# Patient Record
Sex: MALE | Race: BLACK OR AFRICAN AMERICAN | URBAN - METROPOLITAN AREA
[De-identification: names, ages, dates, MRNs, and addresses within clinical notes are randomized per-mention and may not be internally consistent; named-entity substitution may affect disease eponyms.]

---

## 2020-02-12 ENCOUNTER — APPOINTMENT (RX ONLY)
Dept: URBAN - METROPOLITAN AREA CLINIC 37 | Facility: CLINIC | Age: 50
Setting detail: DERMATOLOGY
End: 2020-02-12

## 2020-02-12 DIAGNOSIS — L81.9 DISORDER OF PIGMENTATION, UNSPECIFIED: ICD-10-CM

## 2020-02-12 DIAGNOSIS — A63.0 ANOGENITAL (VENEREAL) WARTS: ICD-10-CM

## 2020-02-12 DIAGNOSIS — L72.8 OTHER FOLLICULAR CYSTS OF THE SKIN AND SUBCUTANEOUS TISSUE: ICD-10-CM

## 2020-02-12 PROBLEM — D40.8 NEOPLASM OF UNCERTAIN BEHAVIOR OF OTHER SPECIFIED MALE GENITAL ORGANS: Status: ACTIVE | Noted: 2020-02-12

## 2020-02-12 PROCEDURE — ? COUNSELING

## 2020-02-12 PROCEDURE — 54100 BIOPSY OF PENIS: CPT

## 2020-02-12 PROCEDURE — ? BIOPSY BY SHAVE METHOD

## 2020-02-12 PROCEDURE — 99203 OFFICE O/P NEW LOW 30 MIN: CPT | Mod: 25

## 2020-02-12 ASSESSMENT — LOCATION DETAILED DESCRIPTION DERM
LOCATION DETAILED: RIGHT MEDIAL INFERIOR CHEST
LOCATION DETAILED: LEFT DORSAL SHAFT OF PENIS
LOCATION DETAILED: SUBMENTAL CHIN
LOCATION DETAILED: LEFT INFERIOR LATERAL NECK

## 2020-02-12 ASSESSMENT — LOCATION SIMPLE DESCRIPTION DERM
LOCATION SIMPLE: PENIS
LOCATION SIMPLE: CHEST
LOCATION SIMPLE: SUBMENTAL CHIN
LOCATION SIMPLE: LEFT ANTERIOR NECK

## 2020-02-12 ASSESSMENT — LOCATION ZONE DERM
LOCATION ZONE: FACE
LOCATION ZONE: TRUNK
LOCATION ZONE: PENIS
LOCATION ZONE: NECK

## 2020-02-12 NOTE — HPI: DISCOLORATION
How Severe Is Your Skin Discoloration?: moderate
Additional History: Patient has not tried any treatment. Patient states it has lightened up since he lost weight.

## 2020-02-12 NOTE — PROCEDURE: MIPS QUALITY
Quality 226: Preventive Care And Screening: Tobacco Use: Screening And Cessation Intervention: Patient screened for tobacco use and is an ex/non-smoker
Quality 431: Preventive Care And Screening: Unhealthy Alcohol Use - Screening: Unhealthy alcohol use screening not performed, reason not otherwise specified
Detail Level: Detailed
Quality 110: Preventive Care And Screening: Influenza Immunization: Influenza immunization was not ordered or administered, reason not given
Quality 111:Pneumonia Vaccination Status For Older Adults: Pneumococcal Vaccination not Administered or Previously Received, Reason not Otherwise Specified

## 2020-02-12 NOTE — PROCEDURE: BIOPSY BY SHAVE METHOD
Size Of Lesion In Cm: 0
Anesthesia Volume In Cc (Will Not Render If 0): 0.3
Billing Type: Third-Party Bill
Wound Care: Vaseline
Hide Accession Number?: No
Detail Level: Detailed
Consent: Verbal consent was obtained and risks were reviewed including but not limited to scarring, infection, bleeding, scabbing, incomplete removal, nerve damage and allergy to anesthesia. Also risk that hair will not grow through area.
Silver Nitrate Text: The wound bed was treated with silver nitrate after the biopsy was performed.
Render Post-Care Instructions In Note?: yes
Electrodesiccation And Curettage Text: The wound bed was treated with electrodesiccation and curettage after the biopsy was performed.
Depth Of Biopsy: dermis
Post-Care Instructions: Patient is to keep the biopsy site dry overnight, and then wash daily with mild soap and water, apply petroleum jelly, and cover with a bandaid once daily until healed, usually 1-2 weeks.
Biopsy Type: H and E
Notification Instructions: Patient will be notified of biopsy results. However, patient instructed to call the office if not contacted within 2 weeks.
Type Of Destruction Used: Curettage
Biopsy Method: Acu-Razor
Curettage Text: The wound bed was treated with curettage after the biopsy was done.
Cryotherapy Text: The wound bed was treated with cryotherapy after the biopsy was performed.
Dressing: bandage
Electrodesiccation Text: The wound bed was treated with electrodesiccation after the biopsy was performed.
Lab: [id number]
Lab Facility: [id number]
Path Notes (To The Dermatopathologist): .
Anesthesia Type: 1% lidocaine without epinephrine
Hemostasis: Electrodesiccation

## 2020-03-02 ENCOUNTER — APPOINTMENT (RX ONLY)
Dept: URBAN - METROPOLITAN AREA CLINIC 38 | Facility: CLINIC | Age: 50
Setting detail: DERMATOLOGY
End: 2020-03-02

## 2020-03-02 DIAGNOSIS — L56.5 DISSEMINATED SUPERFICIAL ACTINIC POROKERATOSIS (DSAP): ICD-10-CM

## 2020-03-02 PROCEDURE — 17110 DESTRUCTION B9 LES UP TO 14: CPT

## 2020-03-02 PROCEDURE — ? BENIGN DESTRUCTION

## 2020-03-02 ASSESSMENT — LOCATION ZONE DERM: LOCATION ZONE: PENIS

## 2020-03-02 ASSESSMENT — LOCATION SIMPLE DESCRIPTION DERM: LOCATION SIMPLE: PENIS

## 2020-03-02 ASSESSMENT — LOCATION DETAILED DESCRIPTION DERM: LOCATION DETAILED: LEFT DORSAL SHAFT OF PENIS

## 2020-03-02 NOTE — PROCEDURE: BENIGN DESTRUCTION
Medical Necessity Clause: This procedure was medically necessary because the lesions that were treated were:
Treatment Number (Will Not Render If 0): 0
Add 52 Modifier (Optional): no
Medical Necessity Information: It is in your best interest to select a reason for this procedure from the list below. All of these items fulfill various CMS LCD requirements except the new and changing color options.
Anesthesia Volume In Cc: 0.2
Detail Level: Detailed
Consent: The patient's consent was obtained including but not limited to risks of crusting, scabbing, blistering, scarring, darker or lighter pigmentary change, recurrence, incomplete removal and infection.
Post-Care Instructions: I reviewed with the patient in detail post-care instructions. Patient is to wear sunprotection, and avoid picking at any of the treated lesions. Pt may apply Vaseline to crusted or scabbing areas.

## 2020-03-02 NOTE — PROCEDURE: MIPS QUALITY
Quality 111:Pneumonia Vaccination Status For Older Adults: Pneumococcal Vaccination not Administered or Previously Received, Reason not Otherwise Specified
Quality 431: Preventive Care And Screening: Unhealthy Alcohol Use - Screening: Unhealthy alcohol use screening not performed, reason not otherwise specified
Detail Level: Detailed
Quality 110: Preventive Care And Screening: Influenza Immunization: Influenza immunization was not ordered or administered, reason not given
Quality 226: Preventive Care And Screening: Tobacco Use: Screening And Cessation Intervention: Patient screened for tobacco use and is an ex/non-smoker

## 2021-03-03 ENCOUNTER — APPOINTMENT (RX ONLY)
Dept: URBAN - METROPOLITAN AREA CLINIC 37 | Facility: CLINIC | Age: 51
Setting detail: DERMATOLOGY
End: 2021-03-03

## 2021-03-03 DIAGNOSIS — D17 BENIGN LIPOMATOUS NEOPLASM: ICD-10-CM

## 2021-03-03 DIAGNOSIS — L56.5 DISSEMINATED SUPERFICIAL ACTINIC POROKERATOSIS (DSAP): ICD-10-CM

## 2021-03-03 PROBLEM — D17.1 BENIGN LIPOMATOUS NEOPLASM OF SKIN AND SUBCUTANEOUS TISSUE OF TRUNK: Status: ACTIVE | Noted: 2021-03-03

## 2021-03-03 PROCEDURE — 99213 OFFICE O/P EST LOW 20 MIN: CPT

## 2021-03-03 PROCEDURE — ? TREATMENT REGIMEN

## 2021-03-03 PROCEDURE — ? COUNSELING

## 2021-03-03 PROCEDURE — ? OTHER

## 2021-03-03 PROCEDURE — ? PATHOLOGY DISCUSSION

## 2021-03-03 PROCEDURE — ? ADDITIONAL NOTES

## 2021-03-03 ASSESSMENT — LOCATION ZONE DERM
LOCATION ZONE: TRUNK
LOCATION ZONE: PENIS

## 2021-03-03 ASSESSMENT — LOCATION DETAILED DESCRIPTION DERM
LOCATION DETAILED: LEFT DORSAL SHAFT OF PENIS
LOCATION DETAILED: EPIGASTRIC SKIN
LOCATION DETAILED: RIGHT MEDIAL INFERIOR CHEST
LOCATION DETAILED: LEFT MEDIAL INFERIOR CHEST

## 2021-03-03 ASSESSMENT — LOCATION SIMPLE DESCRIPTION DERM
LOCATION SIMPLE: ABDOMEN
LOCATION SIMPLE: PENIS
LOCATION SIMPLE: CHEST

## 2021-03-03 NOTE — PROCEDURE: OTHER
Other (Free Text): We discussed excision. However, patient declines at this time. Should he opt to at a later time, schedule an excision.
Detail Level: Detailed
Render Risk Assessment In Note?: no
Note Text (......Xxx Chief Complaint.): This diagnosis correlates with the

## 2021-03-03 NOTE — HPI: SKIN LESION
How Severe Is Your Skin Lesion?: mild
Has Your Skin Lesion Been Treated?: not been treated
Is This A New Presentation, Or A Follow-Up?: Skin Lesions
Additional History: Patient has recent lost 40 lbs and has noticed these more lately. Mainly when he is at the gym.